# Patient Record
Sex: FEMALE | Race: WHITE | NOT HISPANIC OR LATINO | Employment: PART TIME | URBAN - NONMETROPOLITAN AREA
[De-identification: names, ages, dates, MRNs, and addresses within clinical notes are randomized per-mention and may not be internally consistent; named-entity substitution may affect disease eponyms.]

---

## 2022-06-15 ENCOUNTER — OFFICE VISIT (OUTPATIENT)
Dept: FAMILY MEDICINE CLINIC | Facility: CLINIC | Age: 27
End: 2022-06-15

## 2022-06-15 VITALS
TEMPERATURE: 97.7 F | DIASTOLIC BLOOD PRESSURE: 70 MMHG | SYSTOLIC BLOOD PRESSURE: 100 MMHG | BODY MASS INDEX: 21.69 KG/M2 | WEIGHT: 138.2 LBS | HEIGHT: 67 IN | HEART RATE: 81 BPM | OXYGEN SATURATION: 99 %

## 2022-06-15 DIAGNOSIS — M67.431 GANGLION CYST OF WRIST, RIGHT: Primary | ICD-10-CM

## 2022-06-15 DIAGNOSIS — Z01.84: ICD-10-CM

## 2022-06-15 DIAGNOSIS — M25.531 RIGHT WRIST PAIN: ICD-10-CM

## 2022-06-15 PROCEDURE — 99203 OFFICE O/P NEW LOW 30 MIN: CPT | Performed by: FAMILY MEDICINE

## 2022-06-15 RX ORDER — NORGESTIMATE AND ETHINYL ESTRADIOL 7DAYSX3 28
1 KIT ORAL DAILY
COMMUNITY

## 2022-06-15 NOTE — ASSESSMENT & PLAN NOTE
We will perform x-rays today.  If necessary, will refer patient to orthopedics although she was informed that they rarely surgically remove these.

## 2022-06-15 NOTE — PROGRESS NOTES
Date: 06/15/2022   Patient Name: Mylene Toussaint  : 1995   MRN: [id number]     Chief Complaint:    Chief Complaint   Patient presents with   • Establish Care   • Wrist Pain     Right hand        History of Present Illness: Mylene Toussaint is a 26 y.o. female who is here today for Right wrist pain.  She reports history of a ganglion cyst on the dorsal aspect of her right wrist for many years.  She states periodically this has caused her pain but recently she has had increasing discomfort with extension of the right wrist.  She would like to discuss further work-up and treatment for this.    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  She has to reevaluate her rabies titer status periodically and would like to have that blood test done today.           Review of Systems:   Review of Systems   Constitutional: Negative for chills, fatigue and fever.   Respiratory: Negative for cough and shortness of breath.    Cardiovascular: Negative for chest pain and palpitations.   Gastrointestinal: Negative for abdominal pain, constipation, diarrhea, nausea and vomiting.   Musculoskeletal: Positive for arthralgias and joint swelling. Negative for back pain and myalgias.   Neurological: Negative for dizziness and headache.   Psychiatric/Behavioral: Negative for depressed mood. The patient is not nervous/anxious.        Past Medical History: History reviewed. No pertinent past medical history.    Past Surgical History: History reviewed. No pertinent surgical history.    Family History:   Family History   Adopted: Yes       Social History:   Social History     Socioeconomic History   • Marital status: Single   • Number of children: 0   Tobacco Use   • Smoking status: Never Smoker   • Smokeless tobacco: Never Used   Vaping Use   • Vaping Use: Never used   Substance and Sexual Activity   • Alcohol use: Yes     Comment: occasionally   • Drug use: Never   • Sexual activity: Yes     Partners: Male     " Birth control/protection: OCP       Medications:     Current Outpatient Medications:   •  norgestimate-ethinyl estradiol (ORTHO TRI-CYCLEN,TRINESSA) 0.18/0.215/0.25 MG-35 MCG per tablet, Take 1 tablet by mouth Daily., Disp: , Rfl:     Allergies:   No Known Allergies      Physical Exam:  Vital Signs:   Vitals:    06/15/22 1325   BP: 100/70   BP Location: Left arm   Patient Position: Sitting   Cuff Size: Adult   Pulse: 81   Temp: 97.7 °F (36.5 °C)   TempSrc: Temporal   SpO2: 99%   Weight: 62.7 kg (138 lb 3.2 oz)   Height: 170.2 cm (67\")     Body mass index is 21.65 kg/m².     Physical Exam  Vitals and nursing note reviewed.   Constitutional:       Appearance: Normal appearance.   HENT:      Head: Normocephalic and atraumatic.   Cardiovascular:      Rate and Rhythm: Normal rate and regular rhythm.      Heart sounds: Normal heart sounds. No murmur heard.  Pulmonary:      Effort: Pulmonary effort is normal.      Breath sounds: Normal breath sounds. No wheezing.   Musculoskeletal:         General: No tenderness.      Comments: Ganglion cyst on dorsal right wrist.  Normal range of motion, pain with extension of the right wrist.  Normal  strength   Skin:     General: Skin is warm.   Neurological:      Mental Status: She is alert and oriented to person, place, and time. Mental status is at baseline.   Psychiatric:         Mood and Affect: Mood normal.         Behavior: Behavior normal.           Assessment/Plan:   Diagnoses and all orders for this visit:    1. Ganglion cyst of wrist, right (Primary)  Assessment & Plan:  We will perform x-rays today.  If necessary, will refer patient to orthopedics although she was informed that they rarely surgically remove these.    Orders:  -     XR Wrist 3+ View Right; Future    2. Right wrist pain  Assessment & Plan:  X-rays as above.    Orders:  -     XR Wrist 3+ View Right; Future    3. Immunity to rabies virus determined by serologic test  -     Rabies Virus Neutralizing Antibody " Titer; Future         Follow Up:   Return if symptoms worsen or fail to improve.    Mylene Warner, DO  [facility]

## 2022-07-20 ENCOUNTER — TELEPHONE (OUTPATIENT)
Dept: FAMILY MEDICINE CLINIC | Facility: CLINIC | Age: 27
End: 2022-07-20

## 2022-07-20 NOTE — TELEPHONE ENCOUNTER
Caller: Mylene Toussaint    Relationship: Self    Best call back number: [phone] (H)    Caller requesting test results: MYLENE    What test was performed: RABIES TITER    When was the test performed: 06/15/22    Where was the test performed: OFFICE     Additional notes: CALLING FOR THE RESULTS

## 2023-04-10 ENCOUNTER — TELEPHONE (OUTPATIENT)
Dept: FAMILY MEDICINE CLINIC | Facility: CLINIC | Age: 28
End: 2023-04-10

## 2023-04-10 ENCOUNTER — TELEMEDICINE (OUTPATIENT)
Dept: FAMILY MEDICINE CLINIC | Facility: CLINIC | Age: 28
End: 2023-04-10
Payer: COMMERCIAL

## 2023-04-10 VITALS — WEIGHT: 142 LBS | BODY MASS INDEX: 22.29 KG/M2 | HEIGHT: 67 IN
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
## 2023-04-10 DIAGNOSIS — S90.861A TICK BITE OF FOOT, RIGHT, INITIAL ENCOUNTER: Primary | ICD-10-CM

## 2023-04-10 RX ORDER — TRIAMCINOLONE ACETONIDE 1 MG/G
1 CREAM TOPICAL 2 TIMES DAILY
Qty: 45 G | Refills: 0 | Status: SHIPPED | OUTPATIENT
Start: 2023-04-10

## 2023-04-10 RX ORDER — AZITHROMYCIN 250 MG/1
TABLET, FILM COATED ORAL
Qty: 6 TABLET | Refills: 0 | Status: SHIPPED | OUTPATIENT
Start: 2023-04-10 | End: 2023-04-15

## 2023-04-10 NOTE — PROGRESS NOTES
"Chief Complaint  Tick Removal (Right Foot x3 days)    Subjective  Patient presents during the COVID-19  pandemic/federally declared state of public health emergency.  For today's visit this provider is located at [facility]. Patient was seen today through synchronous audio/video technology using [employer] technology. Verbal consent was obtained. The patient was located at work. Vitals signs were not obtained due to lack of home monitoring access. Time spent with patient was 15 minutes.      Mylene Toussaint presents to [facility]  History of Present Illness patient reports that she was out in the woods on Saturday and then late last night she noticed a tick on her foot and she had some swelling and redness and a blister.  She pulled the tick off but wanted to check in with us to see if she needed to be concerned.  She is concerned about marie things like Lyme disease, early ketosis or alpha gal.    Objective   Vital Signs:   Ht 170.2 cm (67\")   Wt 64.4 kg (142 lb) Comment: Pt reported vitals  BMI 22.24 kg/m²     Body mass index is 22.24 kg/m².    Review of Systems   Constitutional: Negative for chills, fatigue and fever.   Respiratory: Negative for cough and shortness of breath.    Cardiovascular: Negative for chest pain and palpitations.   Musculoskeletal: Negative for back pain and myalgias.   Skin:        Blister at the site of the tick's embedding, slight swelling and redness surrounding it.    Neurological: Negative for dizziness and headache.   Psychiatric/Behavioral: Negative for depressed mood. The patient is not nervous/anxious.        Past History:  Medical History: has no past medical history on file.   Surgical History: has no past surgical history on file.   Family History: family history is not on file. She was adopted.   Social History: reports that she has never smoked. She has never used smokeless tobacco. She reports current alcohol use. She " reports that she does not use drugs.      Current Outpatient Medications:   •  norgestimate-ethinyl estradiol (ORTHO TRI-CYCLEN,TRINESSA) 0.18/0.215/0.25 MG-35 MCG per tablet, Take 1 tablet by mouth Daily., Disp: , Rfl:   •  azithromycin (Zithromax Z-Darrius) 250 MG tablet, Take 2 tablets by mouth Daily for 1 day, THEN 1 tablet Daily for 4 days., Disp: 6 tablet, Rfl: 0  •  triamcinolone (KENALOG) 0.1 % cream, Apply 1 application topically to the appropriate area as directed 2 (Two) Times a Day., Disp: 45 g, Rfl: 0    Allergies: Patient has no known allergies.    Physical Exam  Constitutional:       Appearance: Normal appearance.   Neurological:      Mental Status: She is alert and oriented to person, place, and time.   Psychiatric:         Mood and Affect: Mood normal.         Behavior: Behavior normal.          Result Review :                   Assessment and Plan    Diagnoses and all orders for this visit:    1. Tick bite of foot, right, initial encounter (Primary)  Assessment & Plan:  She is having a reaction and therefore I am going to give her some triamcinolone cream to put directly on the skin and Rx for azithromycin in case there is infection and to cover for Lyme disease prevention. Call or return if symptoms persist or worsen.       Other orders  -     azithromycin (Zithromax Z-Darrius) 250 MG tablet; Take 2 tablets by mouth Daily for 1 day, THEN 1 tablet Daily for 4 days.  Dispense: 6 tablet; Refill: 0  -     triamcinolone (KENALOG) 0.1 % cream; Apply 1 application topically to the appropriate area as directed 2 (Two) Times a Day.  Dispense: 45 g; Refill: 0      Follow Up   Return if symptoms worsen or fail to improve.  Patient was given instructions and counseling regarding her condition or for health maintenance advice. Please see specific information pulled into the AVS if appropriate.     Carmen Vasquez PA-C

## 2023-04-10 NOTE — TELEPHONE ENCOUNTER
Caller: Mylene Toussaint    Relationship: Self    Best call back number: [phone]    What form or medical record are you requesting:     WORK EXCUSE FOR 4/10 MYCHART    Who is requesting this form or medical record from you: EMPLOYER    How would you like to receive the form or medical records (pick-up, mail, fax):     [email or web address]     Timeframe paperwork needed:     AS SOON AS POSSIBLE

## 2023-04-10 NOTE — ASSESSMENT & PLAN NOTE
She is having a reaction and therefore I am going to give her some triamcinolone cream to put directly on the skin and Rx for azithromycin in case there is infection and to cover for Lyme disease prevention. Call or return if symptoms persist or worsen.

## 2025-04-25 ENCOUNTER — TELEPHONE (OUTPATIENT)
Dept: FAMILY MEDICINE CLINIC | Facility: CLINIC | Age: 30
End: 2025-04-25
Payer: COMMERCIAL

## 2025-04-25 NOTE — TELEPHONE ENCOUNTER
CALLED PATIENT TO CONFIRM APPT FOR MONDAY LEFT [employer] AND TOLD TO CALL BACK WITH ANY QUESTIONS BUT WE PLANNED TO SEE THEM MONDAY

## 2025-08-25 ENCOUNTER — OFFICE VISIT (OUTPATIENT)
Dept: FAMILY MEDICINE CLINIC | Facility: CLINIC | Age: 30
End: 2025-08-25
Payer: MEDICAID

## 2025-08-25 VITALS
BODY MASS INDEX: 22.29 KG/M2 | HEIGHT: 67 IN | OXYGEN SATURATION: 98 % | HEART RATE: 77 BPM | WEIGHT: 142 LBS | DIASTOLIC BLOOD PRESSURE: 80 MMHG | SYSTOLIC BLOOD PRESSURE: 108 MMHG

## 2025-08-25 DIAGNOSIS — S70.12XA HEMATOMA OF THIGH, LEFT, INITIAL ENCOUNTER: Primary | ICD-10-CM

## 2025-08-25 DIAGNOSIS — F41.9 ANXIETY: ICD-10-CM

## 2025-08-25 PROCEDURE — 99213 OFFICE O/P EST LOW 20 MIN: CPT | Performed by: PHYSICIAN ASSISTANT

## 2025-08-25 PROCEDURE — 1160F RVW MEDS BY RX/DR IN RCRD: CPT | Performed by: PHYSICIAN ASSISTANT

## 2025-08-25 PROCEDURE — 1159F MED LIST DOCD IN RCRD: CPT | Performed by: PHYSICIAN ASSISTANT
